# Patient Record
(demographics unavailable — no encounter records)

---

## 2024-10-31 NOTE — HISTORY OF PRESENT ILLNESS
[FreeTextEntry1] : 79 year male who comes for follow-up of his CAD  Having an endoscopy with Dr Barbara Ochoa on 11/20. fax [phone]. He denies having any chest pain, SOB, VICTOR, dizziness, orthopnea, PND or syncope. rare palpitations- no dizziness. We reviewed his CTA and Echo in detail  cta -, non obstructive  echo- ef 64%, mild to mod LVH, no sig valve dx ekg-sr nsst changes

## 2024-10-31 NOTE — ASSESSMENT
[FreeTextEntry1] : pre op -cleared for endoscopy  -stop asa 1 week prior   cont current treatment regimen

## 2025-03-11 NOTE — DISCUSSION/SUMMARY
[FreeTextEntry1] : he does have an overlap type syndrome with elevated niox however his spirometry is unchanged and the pcr retruned with non covid coronavirus gave him short course of prednisone

## 2025-03-11 NOTE — HISTORY OF PRESENT ILLNESS
[TextBox_4] : patient with hx of prostate cancer also overlap syndrome going for mri says has new grown but psa is not enlarged lung wise coughing up mucous, voice has chaned niox is elevated. coughing up clear around ten days has no other symptoms so doen's think he has a virus trelegy given five days of prednsione this is likely a virus

## 2025-03-11 NOTE — REASON FOR VISIT
[Follow-Up] : a follow-up visit [TextBox_44] : patient here with cough, likely from a virus" patient here with a cough, likely from a virus " but i'm not sick"